# Patient Record
Sex: MALE | Race: WHITE | NOT HISPANIC OR LATINO | Employment: OTHER | URBAN - METROPOLITAN AREA
[De-identification: names, ages, dates, MRNs, and addresses within clinical notes are randomized per-mention and may not be internally consistent; named-entity substitution may affect disease eponyms.]

---

## 2017-08-09 ENCOUNTER — OFFICE VISIT (OUTPATIENT)
Dept: OPHTHALMOLOGY | Facility: CLINIC | Age: 77
End: 2017-08-09
Payer: COMMERCIAL

## 2017-08-09 DIAGNOSIS — H35.341 MACULAR HOLE OF RIGHT EYE: ICD-10-CM

## 2017-08-09 DIAGNOSIS — H35.372 EPIRETINAL MEMBRANE, LEFT: ICD-10-CM

## 2017-08-09 DIAGNOSIS — H25.812 COMBINED FORMS OF AGE-RELATED CATARACT OF LEFT EYE: ICD-10-CM

## 2017-08-09 DIAGNOSIS — H52.4 PRESBYOPIA: ICD-10-CM

## 2017-08-09 DIAGNOSIS — Z98.890 HISTORY OF VITRECTOMY: ICD-10-CM

## 2017-08-09 DIAGNOSIS — Z96.1 PSEUDOPHAKIA: ICD-10-CM

## 2017-08-09 DIAGNOSIS — Z01.01 ENCOUNTER FOR EXAMINATION OF EYES AND VISION WITH ABNORMAL FINDINGS: Primary | ICD-10-CM

## 2017-08-09 DIAGNOSIS — H43.812 POSTERIOR VITREOUS DETACHMENT, LEFT: ICD-10-CM

## 2017-08-09 DIAGNOSIS — D31.31 CHOROIDAL NEVUS, BOTH EYES: ICD-10-CM

## 2017-08-09 DIAGNOSIS — D31.32 CHOROIDAL NEVUS, BOTH EYES: ICD-10-CM

## 2017-08-09 PROCEDURE — 92015 DETERMINE REFRACTIVE STATE: CPT | Performed by: OPHTHALMOLOGY

## 2017-08-09 PROCEDURE — 92014 COMPRE OPH EXAM EST PT 1/>: CPT | Performed by: OPHTHALMOLOGY

## 2017-08-09 ASSESSMENT — REFRACTION_MANIFEST
OS_SPHERE: +0.50
OD_AXIS: 180
OD_SPHERE: PLANO
OD_ADD: +2.75
OS_CYLINDER: +0.25
OS_ADD: +2.75
OD_CYLINDER: +0.50
OS_AXIS: 180

## 2017-08-09 ASSESSMENT — REFRACTION_WEARINGRX
SPECS_TYPE: PAL
OS_ADD: +2.75
OD_ADD: +2.75
OS_SPHERE: PLANO
OD_SPHERE: PLANO
OS_AXIS: 050
OS_CYLINDER: +0.50
OD_CYLINDER: +0.50
OD_AXIS: 055

## 2017-08-09 ASSESSMENT — VISUAL ACUITY
METHOD: SNELLEN - LINEAR
OD_CC+: -1
CORRECTION_TYPE: GLASSES
OD_CC: 20/20
OS_CC: 20/25

## 2017-08-09 ASSESSMENT — EXTERNAL EXAM - RIGHT EYE: OD_EXAM: 1+ BROW PTOSIS

## 2017-08-09 ASSESSMENT — CONF VISUAL FIELD
OS_NORMAL: 1
OD_NORMAL: 1
METHOD: COUNTING FINGERS

## 2017-08-09 ASSESSMENT — SLIT LAMP EXAM - LIDS: COMMENTS: 2+ DERMATOCHALASIS - UPPER LID

## 2017-08-09 ASSESSMENT — CUP TO DISC RATIO
OS_RATIO: 0.2
OD_RATIO: 0.3

## 2017-08-09 ASSESSMENT — EXTERNAL EXAM - LEFT EYE: OS_EXAM: 1+ BROW PTOSIS

## 2017-08-09 ASSESSMENT — TONOMETRY
OS_IOP_MMHG: 17
OD_IOP_MMHG: 15
IOP_METHOD: APPLANATION

## 2017-08-09 NOTE — PROGRESS NOTES
Current Eye Medications:  no     Subjective:  Complete eye exam. Vision is down Distance and near, slowly getting worse both eyes. Zero pain both eyes. Sometimes eyes water and burn like crazy a couple of times a month.      Objective:  See Ophthalmology Exam.       Assessment:  Stable eye exam.      ICD-10-CM    1. Encounter for examination of eyes and vision with abnormal findings Z01.01 REFRACTIVE STATUS   2. Presbyopia H52.4 REFRACTIVE STATUS   3. Pseudophakia, Yag Caps, od Z96.1 EYE EXAM (SIMPLE-NONBILLABLE)   4. Combined forms of age-related cataract, mild, left eye H25.812    5. Epiretinal membrane, mild, left H35.372    6. Hx of macular hole, od (JJ) H35.341    7. 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 History of vitrectomy, od Z98.890    9. Posterior vitreous detachment, left H43.812         Plan:  Glasses Rx given - optional   Use artificial tears up to 4 times daily both eyes. (Refresh Tears or Systane Ultra/Balance)   Call in April 2018 for an appointment in August 2018 for Complete Exam    Dr. Eubanks [phone]

## 2017-08-09 NOTE — PATIENT INSTRUCTIONS
Glasses Rx given - optional   Use artificial tears up to 4 times daily both eyes. (Refresh Tears or Systane Ultra/Balance)   Call in April 2018 for an appointment in August 2018 for Complete Exam    Dr. Eubanks [phone]

## 2017-08-09 NOTE — MR AVS SNAPSHOT
After Visit Summary   8/9/2017    Reggie Hamm    MRN: [id number]           Patient Information     Date Of Birth          1940        Visit Information        Provider Department      8/9/2017 8:00 AM Aris Eubanks MD [facility]        Today's Diagnoses     Encounter for examination of eyes and vision with abnormal findings    -  1    Presbyopia        Regular astigmatism of both eyes        Posterior vitreous detachment, left        Epiretinal membrane, mild, left        Hx of macular hole, od (JJ)        Choroidal nevus, both eyes        Combined forms of age-related cataract, mild left eye        Pseudophakia, Yag Caps, od        History of vitrectomy, od          Care Instructions    Glasses Rx given - optional   Use artificial tears up to 4 times daily both eyes. (Refresh Tears or Systane Ultra/Balance)   Call in April 2018 for an appointment in August 2018 for Complete Exam    Dr. Eubanks [phone]          Follow-ups after your visit        Who to contact     If you have questions or need follow up information about today's clinic visit or your schedule please contact [facility] directly at [phone].  Normal or non-critical lab and imaging results will be communicated to you by Like[employer]fmhart, letter or phone within 4 business days after the clinic has received the results. If you do not hear from us within 7 days, please contact the clinic through Like[employer]fmhart or phone. If you have a critical or abnormal lab result, we will notify you by phone as soon as possible.  Submit refill requests through [employer] or call your pharmacy and they will forward the refill request to us. Please allow 3 business days for your refill to be completed.          Additional Information About Your Visit        MyChart Information     [employer] lets you send messages to your doctor, view your test results, renew your prescriptions, schedule appointments and more. To sign up, go to  [email or web address] . Click on \"Log in\" on the left side of the screen, which will take you to the Welcome page. Then click on \"Sign up Now\" on the right side of the page.     You will be asked to enter the access code listed below, as well as some personal information. Please follow the directions to create your username and password.     Your access code is: [email or web address]  Expires: 2017  8:57 AM     Your access code will  in 90 days. If you need help or a new code, please call your [place] clinic or [phone].        Care EveryWhere ID     This is your Care EveryWhere ID. This could be used by other organizations to access your [place] medical records  [id number]         Blood Pressure from Last 3 Encounters:   No data found for BP    Weight from Last 3 Encounters:   No data found for Wt              We Performed the Following     EYE EXAM (SIMPLE-NONBILLABLE)     REFRACTIVE STATUS        Primary Care Provider Office Phone # Fax #    Amari Castañeda [phone]       [facility] [address] [place] [address]        Equal Access to Services     [facility]: Hadii aad ku hadasho Soomaali, waaxda luqadaha, qaybta kaalmada adeegyada, waxay idiin hayaan adeeg valerie layungn ah. So [facility] [phone].    ATENCIÓN: Si habla español, tiene a gonzalez disposición servicios gratuitos de asistencia lingüística. Cindyame al [phone].    We comply with applicable federal civil rights laws and [place] laws. We do not discriminate on the basis of race, color, national origin, age, disability sex, sexual orientation or gender identity.            Thank you!     Thank you for choosing [facility]  for your care. Our goal is always to provide you with excellent care. Hearing back from our patients is one way we can continue to improve our services. Please take a few minutes to complete the written survey that you may receive in the mail after your visit with us. Thank you!   "           Your Updated Medication List - Protect others around you: Learn how to safely use, store and throw away your medicines at www.disposemymeds.org.          This list is accurate as of: 8/9/17  8:57 AM.  Always use your most recent med list.                   Brand Name Dispense Instructions for use Diagnosis    aspirin 325 MG tablet      Take  by mouth daily.        ATENOLOL PO      Take  by mouth.        clonazePAM 0.5 MG tablet    klonoPIN     Take 0.5 mg by mouth 2 times daily as needed.        FISH OIL PO      Take  by mouth.        FUROSEMIDE PO      Take  by mouth.        INDOCIN PO      Take  by mouth.        LISINOPRIL PO      Take  by mouth.        SIMVASTATIN PO      Take  by mouth.        Vitamin D-3 5000 UNITS Tabs      Take  by mouth.

## 2017-08-11 PROBLEM — H25.812 COMBINED FORMS OF AGE-RELATED CATARACT OF LEFT EYE: Status: ACTIVE | Noted: 2017-08-11

## 2018-09-14 ENCOUNTER — OFFICE VISIT (OUTPATIENT)
Dept: OPHTHALMOLOGY | Facility: CLINIC | Age: 78
End: 2018-09-14
Payer: COMMERCIAL

## 2018-09-14 DIAGNOSIS — H43.812 POSTERIOR VITREOUS DETACHMENT, LEFT: ICD-10-CM

## 2018-09-14 DIAGNOSIS — Z96.1 PSEUDOPHAKIA: ICD-10-CM

## 2018-09-14 DIAGNOSIS — H52.4 PRESBYOPIA: ICD-10-CM

## 2018-09-14 DIAGNOSIS — Z98.890 HISTORY OF VITRECTOMY: ICD-10-CM

## 2018-09-14 DIAGNOSIS — D31.31 CHOROIDAL NEVUS, BOTH EYES: ICD-10-CM

## 2018-09-14 DIAGNOSIS — H35.341 MACULAR HOLE OF RIGHT EYE: ICD-10-CM

## 2018-09-14 DIAGNOSIS — Z01.01 ENCOUNTER FOR EXAMINATION OF EYES AND VISION WITH ABNORMAL FINDINGS: Primary | ICD-10-CM

## 2018-09-14 DIAGNOSIS — D31.32 CHOROIDAL NEVUS, BOTH EYES: ICD-10-CM

## 2018-09-14 DIAGNOSIS — H35.372 EPIRETINAL MEMBRANE, LEFT: ICD-10-CM

## 2018-09-14 DIAGNOSIS — H25.812 COMBINED FORMS OF AGE-RELATED CATARACT OF LEFT EYE: ICD-10-CM

## 2018-09-14 PROCEDURE — 92014 COMPRE OPH EXAM EST PT 1/>: CPT | Performed by: OPHTHALMOLOGY

## 2018-09-14 PROCEDURE — 92134 CPTRZ OPH DX IMG PST SGM RTA: CPT | Performed by: OPHTHALMOLOGY

## 2018-09-14 PROCEDURE — 92015 DETERMINE REFRACTIVE STATE: CPT | Performed by: OPHTHALMOLOGY

## 2018-09-14 ASSESSMENT — CONF VISUAL FIELD
OD_NORMAL: 1
OS_NORMAL: 1

## 2018-09-14 ASSESSMENT — REFRACTION_WEARINGRX
OD_ADD: +2.75
OD_AXIS: 155
OS_ADD: +2.75
OS_CYLINDER: +0.75
OS_AXIS: 050
OS_SPHERE: PLANO
OD_CYLINDER: +0.50
SPECS_TYPE: PAL
OD_SPHERE: -0.12

## 2018-09-14 ASSESSMENT — VISUAL ACUITY
OD_CC: 20/25-1
METHOD: SNELLEN - LINEAR
OD_CC: J2+
OS_CC: 20/20-2
CORRECTION_TYPE: GLASSES
OS_CC: J1

## 2018-09-14 ASSESSMENT — EXTERNAL EXAM - LEFT EYE: OS_EXAM: 1+ BROW PTOSIS

## 2018-09-14 ASSESSMENT — EXTERNAL EXAM - RIGHT EYE: OD_EXAM: 1+ BROW PTOSIS

## 2018-09-14 ASSESSMENT — TONOMETRY
IOP_METHOD: APPLANATION
OD_IOP_MMHG: 17
OS_IOP_MMHG: 16

## 2018-09-14 ASSESSMENT — REFRACTION_MANIFEST
OS_ADD: +3.00
OD_AXIS: 175
OS_AXIS: 045
OD_CYLINDER: +1.00
OS_SPHERE: +0.25
OS_CYLINDER: +0.50
OD_SPHERE: PLANO
OD_ADD: +3.00

## 2018-09-14 ASSESSMENT — CUP TO DISC RATIO
OD_RATIO: 0.3
OS_RATIO: 0.2

## 2018-09-14 ASSESSMENT — SLIT LAMP EXAM - LIDS: COMMENTS: 2+ DERMATOCHALASIS - UPPER LID

## 2018-09-14 ASSESSMENT — REFRACTION: OD_SPHERE: PLANO

## 2018-09-14 NOTE — MR AVS SNAPSHOT
After Visit Summary   9/14/2018    Reggie Hamm    MRN: [id number]           Patient Information     Date Of Birth          1940        Visit Information        Provider Department      9/14/2018 2:00 PM Aris Eubanks MD [facility]        Today's Diagnoses     Encounter for examination of eyes and vision with abnormal findings    -  1    Presbyopia        Combined forms of age-related cataract, mild, left eye        Posterior vitreous detachment, left        Epiretinal membrane, mild, left        Hx of macular hole, od (JJ)        Choroidal nevus, both eyes        Pseudophakia, Yag Caps, od        History of vitrectomy, od          Care Instructions    Glasses Rx given - optional.  Call in May 2019 for an appointment in September 2019 for Complete Exam.    Dr. Eubanks [phone]            Follow-ups after your visit        Who to contact     If you have questions or need follow up information about today's clinic visit or your schedule please contact [facility] directly at [phone].  Normal or non-critical lab and imaging results will be communicated to you by MyChart, letter or phone within 4 business days after the clinic has received the results. If you do not hear from us within 7 days, please contact the clinic through MyChart or phone. If you have a critical or abnormal lab result, we will notify you by phone as soon as possible.  Submit refill requests through [employer] or call your pharmacy and they will forward the refill request to us. Please allow 3 business days for your refill to be completed.          Additional Information About Your Visit        Care EveryWhere ID     This is your Care EveryWhere ID. This could be used by other organizations to access your [place] medical records  [id number]         Blood Pressure from Last 3 Encounters:   No data found for BP    Weight from Last 3 Encounters:   No data found for Wt              Today,  you had the following     No orders found for display       Primary Care Provider Office Phone # Fax #    Amari Castañeda [phone]       [facility] [address] [place] [address]        Equal Access to Services     [facility] : Hadreggie shefali ku hadshireeno Soomaali, waaxda luqadaha, qaybta kaalmada adeegyada, dominick laln samuel padilla laAbelinomalka adam. So [facility] [phone].    ATENCIÓN: Si habla español, tiene a gonzalez disposición servicios gratuitos de asistencia lingüística. Llame al [phone].    We comply with applicable federal civil rights laws and [place] laws. We do not discriminate on the basis of race, color, national origin, age, disability, sex, sexual orientation, or gender identity.            Thank you!     Thank you for choosing [facility]  for your care. Our goal is always to provide you with excellent care. Hearing back from our patients is one way we can continue to improve our services. Please take a few minutes to complete the written survey that you may receive in the mail after your visit with us. Thank you!             Your Updated Medication List - Protect others around you: Learn how to safely use, store and throw away your medicines at www.disposemymeds.org.          This list is accurate as of 9/14/18  3:35 PM.  Always use your most recent med list.                   Brand Name Dispense Instructions for use Diagnosis    aspirin 325 MG tablet      Take  by mouth daily.        ATENOLOL PO      Take  by mouth.        clonazePAM 0.5 MG tablet    klonoPIN     Take 0.5 mg by mouth 2 times daily as needed.        FISH OIL PO      Take  by mouth.        FUROSEMIDE PO      Take  by mouth.        INDOCIN PO      Take  by mouth.        LISINOPRIL PO      Take  by mouth.        SIMVASTATIN PO      Take  by mouth.        Vitamin D-3 5000 units Tabs      Take  by mouth.

## 2018-09-14 NOTE — PATIENT INSTRUCTIONS
Glasses Rx given - optional.  Call in May 2019 for an appointment in September 2019 for Complete Exam.    Dr. Eubanks [phone]

## 2018-09-14 NOTE — LETTER
9/14/2018         RE: Reggie Hamm  [address] [address]        Dear Colleague,    Thank you for referring your patient, Reggie Hamm, to the [facility]. Please see a copy of my visit note below.     Current Eye Medications:  no     Subjective:  Comprehensive eye exam.   Pt reports it is now harder to see smaller print.     Objective:  See Ophthalmology Exam.       Assessment:  Stable eye exam both eyes.  Stable retinal OCT in patient with hx of macular hole repair right eye and epiretinal membrane left eye.      ICD-10-CM    1. Encounter for examination of eyes and vision with abnormal findings Z01.01 REFRACTIVE STATUS   2. Presbyopia H52.4 REFRACTIVE STATUS   3. Hx of macular hole, od (JJ) H35.341 EYE EXAM (SIMPLE-NONBILLABLE)      COMPUTERIZED OPHTHALMIC IMAGING RETINA   4. History of vitrectomy, od Z98.890    5. Pseudophakia, Yag Caps, od Z96.1    6. Combined forms of age-related cataract, mild, left eye H25.812    7. Epiretinal membrane, mild, left H35.372 HC COMPUTERIZED OPHTHALMIC IMAGING RETINA   8. 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 Posterior vitreous detachment, left H43.812         Plan:  Glasses Rx given - optional.  Call in May 2019 for an appointment in September 2019 for Complete Exam.    Dr. Eubanks [phone]           Again, thank you for allowing me to participate in the care of your patient.        Sincerely,        Aris Eubanks MD

## 2018-09-14 NOTE — PROGRESS NOTES
Current Eye Medications:  no     Subjective:  Comprehensive eye exam.   Pt reports it is now harder to see smaller print.     Objective:  See Ophthalmology Exam.       Assessment:  Stable eye exam both eyes.  Stable retinal OCT in patient with hx of macular hole repair right eye and epiretinal membrane left eye.      ICD-10-CM    1. Encounter for examination of eyes and vision with abnormal findings Z01.01 REFRACTIVE STATUS   2. Presbyopia H52.4 REFRACTIVE STATUS   3. Hx of macular hole, od (JJ) H35.341 EYE EXAM (SIMPLE-NONBILLABLE)      COMPUTERIZED OPHTHALMIC IMAGING RETINA   4. History of vitrectomy, od Z98.890    5. Pseudophakia, Yag Caps, od Z96.1    6. Combined forms of age-related cataract, mild, left eye H25.812    7. Epiretinal membrane, mild, left H35.372 HC COMPUTERIZED OPHTHALMIC IMAGING RETINA   8. 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 Posterior vitreous detachment, left H43.812         Plan:  Glasses Rx given - optional.  Call in May 2019 for an appointment in September 2019 for Complete Exam.    Dr. Eubanks [phone]

## 2019-09-16 ENCOUNTER — OFFICE VISIT (OUTPATIENT)
Dept: OPHTHALMOLOGY | Facility: CLINIC | Age: 79
End: 2019-09-16
Payer: COMMERCIAL

## 2019-09-16 DIAGNOSIS — Z01.01 ENCOUNTER FOR EXAMINATION OF EYES AND VISION WITH ABNORMAL FINDINGS: Primary | ICD-10-CM

## 2019-09-16 DIAGNOSIS — H35.341 MACULAR HOLE OF RIGHT EYE: ICD-10-CM

## 2019-09-16 DIAGNOSIS — H35.372 EPIRETINAL MEMBRANE, LEFT: ICD-10-CM

## 2019-09-16 DIAGNOSIS — Z98.890 HISTORY OF VITRECTOMY: ICD-10-CM

## 2019-09-16 DIAGNOSIS — Z96.1 PSEUDOPHAKIA: ICD-10-CM

## 2019-09-16 DIAGNOSIS — H52.4 PRESBYOPIA: ICD-10-CM

## 2019-09-16 DIAGNOSIS — D31.32 CHOROIDAL NEVUS, BOTH EYES: ICD-10-CM

## 2019-09-16 DIAGNOSIS — H43.812 POSTERIOR VITREOUS DETACHMENT, LEFT: ICD-10-CM

## 2019-09-16 DIAGNOSIS — H25.812 COMBINED FORMS OF AGE-RELATED CATARACT OF LEFT EYE: ICD-10-CM

## 2019-09-16 DIAGNOSIS — D31.31 CHOROIDAL NEVUS, BOTH EYES: ICD-10-CM

## 2019-09-16 PROCEDURE — 92015 DETERMINE REFRACTIVE STATE: CPT | Performed by: OPHTHALMOLOGY

## 2019-09-16 PROCEDURE — 92014 COMPRE OPH EXAM EST PT 1/>: CPT | Performed by: OPHTHALMOLOGY

## 2019-09-16 ASSESSMENT — REFRACTION_WEARINGRX
OS_CYLINDER: +0.75
OS_SPHERE: PLANO
OD_ADD: +2.75
OS_ADD: +2.75
OD_SPHERE: -0.12
OD_CYLINDER: +0.50
SPECS_TYPE: PAL
OS_AXIS: 050
OD_AXIS: 155

## 2019-09-16 ASSESSMENT — REFRACTION_MANIFEST
OS_CYLINDER: +0.50
OD_AXIS: 160
OS_AXIS: 020
OD_SPHERE: PLANO
OS_ADD: +2.75
OS_SPHERE: +0.50
OD_ADD: +2.75
OD_CYLINDER: +0.75

## 2019-09-16 ASSESSMENT — VISUAL ACUITY
METHOD: SNELLEN - LINEAR
CORRECTION_TYPE: GLASSES
OS_CC+: -1
OD_CC: 20/25
OD_CC+: -1
OS_CC: 20/30

## 2019-09-16 ASSESSMENT — CONF VISUAL FIELD
OD_NORMAL: 1
METHOD: COUNTING FINGERS
OS_NORMAL: 1

## 2019-09-16 ASSESSMENT — SLIT LAMP EXAM - LIDS: COMMENTS: 2+ DERMATOCHALASIS - UPPER LID

## 2019-09-16 ASSESSMENT — TONOMETRY
OS_IOP_MMHG: 16
OD_IOP_MMHG: 16
IOP_METHOD: APPLANATION

## 2019-09-16 ASSESSMENT — EXTERNAL EXAM - LEFT EYE: OS_EXAM: 1+ BROW PTOSIS

## 2019-09-16 ASSESSMENT — EXTERNAL EXAM - RIGHT EYE: OD_EXAM: 1+ BROW PTOSIS

## 2019-09-16 ASSESSMENT — CUP TO DISC RATIO
OD_RATIO: 0.3
OS_RATIO: 0.2

## 2019-09-16 NOTE — PROGRESS NOTES
Current Eye Medications:  none     Subjective:  Complete eye exam. Vision is slowly getting worse both eyes for last year distance and near. Gets short sharp shooting pains, can be in either eye for last 6-8 months.     Objective:  See Ophthalmology Exam.       Assessment:  Stable eye exam.      ICD-10-CM    1. Encounter for examination of eyes and vision with abnormal findings Z01.01 EYE EXAM (SIMPLE-NONBILLABLE)   2. Presbyopia H52.4 EYE EXAM (SIMPLE-NONBILLABLE)   3. Combined forms of age-related cataract, mild, left eye H25.812 REFRACTION   4. Pseudophakia, Yag Caps, od Z96.1    5. Epiretinal membrane, mild, left H35.372    6. 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 Hx of macular hole, od (JJ) H35.341    8. History of vitrectomy, od Z98.890    9. Posterior vitreous detachment, left H43.812         Plan:  Glasses Rx given - optional.  Use artificial tears up to 4 times daily both eyes.  (Refresh Tears, Systane Ultra/Balance, or Theratears)  Call in May 2020 for an appointment in September 2020 for Complete Exam    Dr. Eubanks [phone]

## 2019-09-16 NOTE — PATIENT INSTRUCTIONS
Glasses Rx given - optional.  Use artificial tears up to 4 times daily both eyes.  (Refresh Tears, Systane Ultra/Balance, or Theratears)  Call in May 2020 for an appointment in September 2020 for Complete Exam    Dr. Eubanks [phone]

## 2019-09-16 NOTE — LETTER
9/16/2019         RE: Reggie Hamm  [address] [address]        Dear Colleague,    Thank you for referring your patient, Reggie Hamm, to the [facility]. Please see a copy of my visit note below.     Current Eye Medications:  none     Subjective:  Complete eye exam. Vision is slowly getting worse both eyes for last year distance and near. Gets short sharp shooting pains, can be in either eye for last 6-8 months.     Objective:  See Ophthalmology Exam.       Assessment:  Stable eye exam.      ICD-10-CM    1. Encounter for examination of eyes and vision with abnormal findings Z01.01 EYE EXAM (SIMPLE-NONBILLABLE)   2. Presbyopia H52.4 EYE EXAM (SIMPLE-NONBILLABLE)   3. Combined forms of age-related cataract, mild, left eye H25.812 REFRACTION   4. Pseudophakia, Yag Caps, od Z96.1    5. Epiretinal membrane, mild, left H35.372    6. 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 Hx of macular hole, od (JJ) H35.341    8. History of vitrectomy, od Z98.890    9. Posterior vitreous detachment, left H43.812         Plan:  Glasses Rx given - optional.  Use artificial tears up to 4 times daily both eyes.  (Refresh Tears, Systane Ultra/Balance, or Theratears)  Call in May 2020 for an appointment in September 2020 for Complete Exam    Dr. Eubanks [phone]             Again, thank you for allowing me to participate in the care of your patient.        Sincerely,        Aris Eubanks MD

## 2023-05-03 ENCOUNTER — OFFICE VISIT (OUTPATIENT)
Dept: OPHTHALMOLOGY | Facility: CLINIC | Age: 83
End: 2023-05-03
Payer: COMMERCIAL

## 2023-05-03 DIAGNOSIS — D31.31 CHOROIDAL NEVUS, BOTH EYES: ICD-10-CM

## 2023-05-03 DIAGNOSIS — Z01.01 ENCOUNTER FOR EXAMINATION OF EYES AND VISION WITH ABNORMAL FINDINGS: Primary | ICD-10-CM

## 2023-05-03 DIAGNOSIS — H52.4 PRESBYOPIA: ICD-10-CM

## 2023-05-03 DIAGNOSIS — H35.341 MACULAR HOLE OF RIGHT EYE: ICD-10-CM

## 2023-05-03 DIAGNOSIS — H25.812 COMBINED FORMS OF AGE-RELATED CATARACT OF LEFT EYE: ICD-10-CM

## 2023-05-03 DIAGNOSIS — D31.32 CHOROIDAL NEVUS, BOTH EYES: ICD-10-CM

## 2023-05-03 DIAGNOSIS — H43.812 POSTERIOR VITREOUS DETACHMENT, LEFT: ICD-10-CM

## 2023-05-03 DIAGNOSIS — Z96.1 PSEUDOPHAKIA: ICD-10-CM

## 2023-05-03 DIAGNOSIS — H35.372 EPIRETINAL MEMBRANE, LEFT: ICD-10-CM

## 2023-05-03 PROBLEM — N18.31 STAGE 3A CHRONIC KIDNEY DISEASE (H): Status: ACTIVE | Noted: 2021-07-02

## 2023-05-03 PROBLEM — G62.9 NEUROPATHY: Status: ACTIVE | Noted: 2023-05-03

## 2023-05-03 PROBLEM — M19.90 ARTHRITIS: Status: ACTIVE | Noted: 2023-05-03

## 2023-05-03 PROBLEM — M1A.9XX0 CHRONIC GOUT WITHOUT TOPHUS: Status: ACTIVE | Noted: 2019-07-02

## 2023-05-03 PROBLEM — E66.9 OBESITY: Status: ACTIVE | Noted: 2023-05-03

## 2023-05-03 PROBLEM — H91.93 DECREASED HEARING OF BOTH EARS: Status: ACTIVE | Noted: 2019-07-02

## 2023-05-03 PROBLEM — Z96.611 STATUS POST REVERSE ARTHROPLASTY OF RIGHT SHOULDER: Status: ACTIVE | Noted: 2019-07-17

## 2023-05-03 PROBLEM — E55.9 VITAMIN D DEFICIENCY: Status: ACTIVE | Noted: 2019-07-02

## 2023-05-03 PROBLEM — L40.8 OTHER PSORIASIS: Status: ACTIVE | Noted: 2019-07-02

## 2023-05-03 PROBLEM — R73.01 IMPAIRED FASTING GLUCOSE: Status: ACTIVE | Noted: 2023-05-03

## 2023-05-03 PROBLEM — F40.240 CLAUSTROPHOBIA: Status: ACTIVE | Noted: 2019-07-02

## 2023-05-03 PROBLEM — R60.9 EDEMA: Status: ACTIVE | Noted: 2023-05-03

## 2023-05-03 PROBLEM — G40.909 EPILEPSY (H): Status: ACTIVE | Noted: 2023-05-03

## 2023-05-03 PROBLEM — E78.5 HYPERLIPIDEMIA, UNSPECIFIED HYPERLIPIDEMIA TYPE: Status: ACTIVE | Noted: 2019-07-02

## 2023-05-03 PROBLEM — G58.9 MONONEURITIS: Status: ACTIVE | Noted: 2019-07-02

## 2023-05-03 PROCEDURE — 92014 COMPRE OPH EXAM EST PT 1/>: CPT | Performed by: OPHTHALMOLOGY

## 2023-05-03 PROCEDURE — 92015 DETERMINE REFRACTIVE STATE: CPT | Performed by: OPHTHALMOLOGY

## 2023-05-03 ASSESSMENT — VISUAL ACUITY
OD_CC: 20/25
OD_CC: J1+
CORRECTION_TYPE: GLASSES
OD_CC+: -1
OS_CC: J1
METHOD: SNELLEN - LINEAR
OS_CC: 20/30

## 2023-05-03 ASSESSMENT — TONOMETRY
IOP_METHOD: APPLANATION
OD_IOP_MMHG: 18
OS_IOP_MMHG: 21

## 2023-05-03 ASSESSMENT — REFRACTION_MANIFEST
OS_SPHERE: +0.75
OD_AXIS: 173
OS_CYLINDER: +0.75
OS_ADD: +2.75
OS_AXIS: 046
OD_SPHERE: -0.25
OD_CYLINDER: +1.00
OD_ADD: +2.75

## 2023-05-03 ASSESSMENT — CUP TO DISC RATIO
OS_RATIO: 0.2
OD_RATIO: 0.3

## 2023-05-03 ASSESSMENT — SLIT LAMP EXAM - LIDS: COMMENTS: 2+ DERMATOCHALASIS - UPPER LID

## 2023-05-03 ASSESSMENT — CONF VISUAL FIELD
OS_INFERIOR_TEMPORAL_RESTRICTION: 0
OD_INFERIOR_NASAL_RESTRICTION: 0
OS_SUPERIOR_NASAL_RESTRICTION: 0
OD_INFERIOR_TEMPORAL_RESTRICTION: 0
OD_NORMAL: 1
OD_SUPERIOR_NASAL_RESTRICTION: 0
OS_INFERIOR_NASAL_RESTRICTION: 0
OS_SUPERIOR_TEMPORAL_RESTRICTION: 0
OD_SUPERIOR_TEMPORAL_RESTRICTION: 0
OS_NORMAL: 1

## 2023-05-03 ASSESSMENT — REFRACTION_WEARINGRX
OD_AXIS: 165
OS_CYLINDER: +0.75
OD_CYLINDER: +0.50
OD_SPHERE: PLANO
SPECS_TYPE: PAL
OS_AXIS: 025
OS_ADD: +2.75
OS_SPHERE: +0.25
OD_ADD: +2.75

## 2023-05-03 ASSESSMENT — EXTERNAL EXAM - RIGHT EYE: OD_EXAM: 1+ BROW PTOSIS

## 2023-05-03 ASSESSMENT — EXTERNAL EXAM - LEFT EYE: OS_EXAM: 1+ BROW PTOSIS

## 2023-05-03 NOTE — LETTER
"    5/3/2023         RE: Reggie Hamm  [address] [address]        Dear Colleague,    Thank you for referring your patient, Reggie Hamm, to the [facility]. Please see a copy of my visit note below.     Current Eye Medications:  Artificial tears both eyes as needed.       Subjective:  Comprehensive Eye Exam.  Patient complains of increased number, and size of black spots.  When reading, occasionally the words disappear.      Objective:  See Ophthalmology Exam.       Assessment:  Stable eye exam.      ICD-10-CM    1. Encounter for examination of eyes and vision with abnormal findings  Z01.01       2. Presbyopia  H52.4       3. Combined forms of age-related cataract, mild, left eye  H25.812       4. Pseudophakia, Yag Caps, od  Z96.1       5. Epiretinal membrane, mild, left  H35.372       6. Hx of macular hole, od (JJ)  H35.341       7. Choroidal nevus, both eyes  D31.31     D31.32       8. Posterior vitreous detachment, left  H43.812            Plan:  Glasses prescription given - optional  May use artificial tears up to four times a day (like Refresh Optive, Systane Balance, TheraTears, or generic artificial tears are ok. Avoid \"get the red out\" drops).   Call in January 2024 for an appointment in May 2024 for Complete Exam    Dr. Eubanks [phone]           Again, thank you for allowing me to participate in the care of your patient.        Sincerely,        Aris Eubanks MD    "

## 2023-05-03 NOTE — PROGRESS NOTES
" Current Eye Medications:  Artificial tears both eyes as needed.       Subjective:  Comprehensive Eye Exam.  Patient complains of increased number, and size of black spots.  When reading, occasionally the words disappear.      Objective:  See Ophthalmology Exam.       Assessment:  Stable eye exam.      ICD-10-CM    1. Encounter for examination of eyes and vision with abnormal findings  Z01.01       2. Presbyopia  H52.4       3. Combined forms of age-related cataract, mild, left eye  H25.812       4. Pseudophakia, Yag Caps, od  Z96.1       5. Epiretinal membrane, mild, left  H35.372       6. Hx of macular hole, od (JJ)  H35.341       7. Choroidal nevus, both eyes  D31.31     D31.32       8. Posterior vitreous detachment, left  H43.812            Plan:  Glasses prescription given - optional  May use artificial tears up to four times a day (like Refresh Optive, Systane Balance, TheraTears, or generic artificial tears are ok. Avoid \"get the red out\" drops).   Call in January 2024 for an appointment in May 2024 for Complete Exam    Dr. Eubanks [phone]       "

## 2023-05-03 NOTE — PATIENT INSTRUCTIONS
"Glasses prescription given - optional  May use artificial tears up to four times a day (like Refresh Optive, Systane Balance, TheraTears, or generic artificial tears are ok. Avoid \"get the red out\" drops).   Call in January 2024 for an appointment in May 2024 for Complete Exam    Dr. Eubanks [phone]    "

## 2025-02-17 ENCOUNTER — OFFICE VISIT (OUTPATIENT)
Dept: OPHTHALMOLOGY | Facility: CLINIC | Age: 85
End: 2025-02-17
Payer: COMMERCIAL

## 2025-02-17 DIAGNOSIS — H43.812 POSTERIOR VITREOUS DETACHMENT, LEFT: ICD-10-CM

## 2025-02-17 DIAGNOSIS — H52.4 PRESBYOPIA: ICD-10-CM

## 2025-02-17 DIAGNOSIS — D31.32 CHOROIDAL NEVUS, BOTH EYES: ICD-10-CM

## 2025-02-17 DIAGNOSIS — H25.812 COMBINED FORMS OF AGE-RELATED CATARACT OF LEFT EYE: ICD-10-CM

## 2025-02-17 DIAGNOSIS — Z96.1 PSEUDOPHAKIA: ICD-10-CM

## 2025-02-17 DIAGNOSIS — H35.372 EPIRETINAL MEMBRANE, LEFT: ICD-10-CM

## 2025-02-17 DIAGNOSIS — Z01.01 ENCOUNTER FOR EXAMINATION OF EYES AND VISION WITH ABNORMAL FINDINGS: Primary | ICD-10-CM

## 2025-02-17 DIAGNOSIS — H35.341 MACULAR HOLE OF RIGHT EYE: ICD-10-CM

## 2025-02-17 DIAGNOSIS — D31.31 CHOROIDAL NEVUS, BOTH EYES: ICD-10-CM

## 2025-02-17 ASSESSMENT — CONF VISUAL FIELD
OD_INFERIOR_TEMPORAL_RESTRICTION: 0
OD_INFERIOR_NASAL_RESTRICTION: 0
OD_SUPERIOR_TEMPORAL_RESTRICTION: 0
OS_INFERIOR_NASAL_RESTRICTION: 0
OD_NORMAL: 1
OD_SUPERIOR_NASAL_RESTRICTION: 0
OS_SUPERIOR_TEMPORAL_RESTRICTION: 0
OS_INFERIOR_TEMPORAL_RESTRICTION: 0
OS_NORMAL: 1
OS_SUPERIOR_NASAL_RESTRICTION: 0

## 2025-02-17 ASSESSMENT — REFRACTION_WEARINGRX
OD_SPHERE: -0.25
OS_AXIS: 043
OS_ADD: +2.75
OD_ADD: +2.75
OD_CYLINDER: +1.25
OD_AXIS: 168
OS_CYLINDER: +0.75
SPECS_TYPE: PAL
OS_SPHERE: +0.75

## 2025-02-17 ASSESSMENT — TONOMETRY
OD_IOP_MMHG: 15
OS_IOP_MMHG: 16
IOP_METHOD: APPLANATION

## 2025-02-17 ASSESSMENT — VISUAL ACUITY
OS_CC: 20/40
OD_CC: 20/25
OS_CC+: +2
CORRECTION_TYPE: GLASSES
OD_CC+: -1
OS_PH_CC: 20/30
OS_PH_CC+: -2
METHOD: SNELLEN - LINEAR

## 2025-02-17 ASSESSMENT — REFRACTION_MANIFEST
OS_AXIS: 005
OS_SPHERE: -0.25
OS_CYLINDER: +1.25
OD_CYLINDER: +1.25
OD_AXIS: 171
OD_ADD: +3.00
OS_ADD: +3.00
OD_SPHERE: -0.50

## 2025-02-17 ASSESSMENT — CUP TO DISC RATIO
OD_RATIO: 0.3
OS_RATIO: 0.2

## 2025-02-17 ASSESSMENT — EXTERNAL EXAM - RIGHT EYE: OD_EXAM: 1+ BROW PTOSIS

## 2025-02-17 ASSESSMENT — SLIT LAMP EXAM - LIDS: COMMENTS: 2+ DERMATOCHALASIS - UPPER LID

## 2025-02-17 ASSESSMENT — EXTERNAL EXAM - LEFT EYE: OS_EXAM: 1+ BROW PTOSIS

## 2025-02-17 NOTE — PATIENT INSTRUCTIONS
"Glasses prescription given - optional    May use artificial tears up to four times a day (like Refresh Optive, Systane Balance, or TheraTears. Avoid \"get the red out\" drops and generic artifical tears).     Call in October 2025 for an appointment in February 2026  for Complete Exam    Dr. Eubanks [phone]    "

## 2025-02-17 NOTE — PROGRESS NOTES
" Current Eye Medications:  none     Subjective:  here for complete eye exam today. Feels the reading is getting a little more difficult lately. Had cataract surgery in the right eye but didn't go well. He doesn't really want to have surgery on the left eye     Objective:  See Ophthalmology Exam.       Assessment:  Stable eye exam.      ICD-10-CM    1. Encounter for examination of eyes and vision with abnormal findings  Z01.01       2. Presbyopia  H52.4       3. Combined forms of age-related cataract, mild, left eye  H25.812       4. Pseudophakia, Yag Caps, od  Z96.1       5. Epiretinal membrane, mild, left  H35.372       6. Hx of macular hole, od (J)  H35.341       7. Choroidal nevus, both eyes  D31.31     D31.32       8. Posterior vitreous detachment, left  H43.812            Plan:  Glasses prescription given - optional    May use artificial tears up to four times a day (like Refresh Optive, Systane Balance, or TheraTears. Avoid \"get the red out\" drops and generic artifical tears).     Call in October 2025 for an appointment in February 2026 for Complete Exam    Dr. Eubanks [phone]    "
